# Patient Record
Sex: MALE | Race: WHITE | Employment: FULL TIME | URBAN - METROPOLITAN AREA
[De-identification: names, ages, dates, MRNs, and addresses within clinical notes are randomized per-mention and may not be internally consistent; named-entity substitution may affect disease eponyms.]

---

## 2022-08-23 ENCOUNTER — OFFICE VISIT (OUTPATIENT)
Dept: ENT CLINIC | Age: 27
End: 2022-08-23
Payer: COMMERCIAL

## 2022-08-23 VITALS
DIASTOLIC BLOOD PRESSURE: 84 MMHG | WEIGHT: 248 LBS | HEIGHT: 72 IN | BODY MASS INDEX: 33.59 KG/M2 | SYSTOLIC BLOOD PRESSURE: 132 MMHG

## 2022-08-23 DIAGNOSIS — J34.3 NASAL TURBINATE HYPERTROPHY: ICD-10-CM

## 2022-08-23 DIAGNOSIS — J34.2 DEVIATED NASAL SEPTUM: ICD-10-CM

## 2022-08-23 DIAGNOSIS — R09.81 NASAL CONGESTION: Primary | ICD-10-CM

## 2022-08-23 DIAGNOSIS — J34.89 SINUS PAIN: ICD-10-CM

## 2022-08-23 PROCEDURE — 99203 OFFICE O/P NEW LOW 30 MIN: CPT | Performed by: PHYSICIAN ASSISTANT

## 2022-08-23 PROCEDURE — 31231 NASAL ENDOSCOPY DX: CPT | Performed by: PHYSICIAN ASSISTANT

## 2022-08-23 RX ORDER — LISDEXAMFETAMINE DIMESYLATE 50 MG
CAPSULE ORAL
COMMUNITY
Start: 2022-08-22

## 2022-08-23 RX ORDER — TRAZODONE HYDROCHLORIDE 50 MG/1
TABLET ORAL
COMMUNITY
Start: 2022-08-02

## 2022-08-23 RX ORDER — ALBUTEROL SULFATE 90 UG/1
AEROSOL, METERED RESPIRATORY (INHALATION)
COMMUNITY
Start: 2022-08-22

## 2022-08-23 RX ORDER — LISINOPRIL 10 MG/1
TABLET ORAL
COMMUNITY
Start: 2022-07-11

## 2022-08-23 RX ORDER — CITALOPRAM 20 MG/1
TABLET ORAL
COMMUNITY

## 2022-08-23 RX ORDER — AZELASTINE HYDROCHLORIDE 137 UG/1
SPRAY, METERED NASAL
COMMUNITY
Start: 2022-08-22

## 2022-08-23 RX ORDER — FLUOXETINE HYDROCHLORIDE 20 MG/1
CAPSULE ORAL
COMMUNITY
Start: 2022-08-15

## 2022-08-23 ASSESSMENT — ENCOUNTER SYMPTOMS
SINUS PRESSURE: 1
EYE DISCHARGE: 0
COUGH: 0
ABDOMINAL PAIN: 0

## 2022-08-23 NOTE — PROGRESS NOTES
Chief Complaint   Patient presents with    Sinus Problem     Patient presents today with c/o recurrent sinus infections. HPI:  Kevin Johnson is a 28 y.o. male seen for evaluation of the sinuses. He reports that he will get pain and pressure in the cheek and forehead sinuses (L>R). He reports that this started 1-2 years ago. Patient denies taking any antibiotics. He reports that has an allergist but has not undergone testing yet. He states that he notices a reaction with pollen. He states that he moved to [place] from [place] 3 years ago and did not have the problems with his sinuses. Patient reports that he has taken an antihistamine which seemed to help. He reports that he has tried flonase and astelin which have seemed to help. He denies any prior sinus surgery. Of note, patient reports that he had outpatient surgery as a child for nosebleeds. Past Medical History, Past Surgical History, Family history, Social History, and Medications were all reviewed with the patient today and updated as necessary. No Known Allergies  There is no problem list on file for this patient. Current Outpatient Medications   Medication Sig    VYVANSE 50 MG capsule     lisinopril (PRINIVIL;ZESTRIL) 10 MG tablet TAKE ONE TABLET BY MOUTH ONE TIME DAILY    FLUoxetine (PROZAC) 20 MG capsule TAKE ONE CAPSULE BY MOUTH ONE TIME DAILY FOR 90 DAYS    citalopram (CELEXA) 20 MG tablet citalopram 20 mg tablet    Azelastine HCl 137 MCG/SPRAY SOLN     albuterol sulfate HFA (PROVENTIL;VENTOLIN;PROAIR) 108 (90 Base) MCG/ACT inhaler     traZODone (DESYREL) 50 MG tablet TAKE ONE TO THREE TABLETS BY MOUTH AT BEDTIME AS NEEDED     No current facility-administered medications for this visit. History reviewed. No pertinent past medical history.   Social History     Tobacco Use    Smoking status: Never    Smokeless tobacco: Never   Substance Use Topics    Alcohol use: Not Currently     Comment: rare use     No past surgical history on file.  No family history on file. ROS:    Review of Systems   Constitutional:  Negative for fever. HENT:  Positive for congestion and sinus pressure. Negative for ear discharge and ear pain. Eyes:  Negative for discharge. Respiratory:  Negative for cough. Cardiovascular:  Negative for chest pain. Gastrointestinal:  Negative for abdominal pain. Endocrine: Negative for cold intolerance. Genitourinary:  Negative for difficulty urinating. Musculoskeletal:  Negative for neck pain. Skin:  Negative for rash. Allergic/Immunologic: Negative for environmental allergies. Neurological:  Negative for dizziness. Hematological:  Negative for adenopathy. Psychiatric/Behavioral:  Negative for agitation. PHYSICAL EXAM:    /84   Ht 6' (1.829 m)   Wt 248 lb (112.5 kg)   BMI 33.63 kg/m²     Head  Head and Face - The head and face are atraumatic, normocephalic. The salivary glands are intact and the facial appearance is symmetric. Head shape - No scars, lesions, or masses    Ear  Ear - Tympanic membranes are clear, the external auditory canal is without discharge and the tympanic membranes are mobile. There is no tympanic membrane erythema and no middle ear opacity is visualized. Pinna: bilateral - No hematomas or lacerations    Eye  Eyeball - bilateral - extraocular motions intact, equal in size and movement    Nose and Sinuses  Nose - mucosa is pink and the septum is deviated to the right with left septal spur. There are no nasal lesions and there was mild turbinate hypertrophy. Mouth and Throat  Lips - upper lip - normal: no dryness, cracking, pallor, cyanosis, or vesicular eruption. Lower lip: normal: no dryness, cracking, pallor, cyanosis, or vesicular eruption. Teeth and Gums - No bleeding, no inflammation or ulceration. Lips - Pink and symmetrical  Oral Cavity - Oral mucosa pink, soft and hard palates contiguous and tongue moist without ulcers.   The mucosa is pill, continue flonase and Astelin. He will follow up in 4-6 weeks for reevaluation. If no improvement, he may need a round of antibiotics and possible CT scan of the sinuses. Thank you for the opportunity to participate in the care of this patient. Please let me know if you have any further questions or concerns.     Bharath Serna PA-C  8/23/2022

## 2022-10-11 ENCOUNTER — OFFICE VISIT (OUTPATIENT)
Dept: ENT CLINIC | Age: 27
End: 2022-10-11
Payer: COMMERCIAL

## 2022-10-11 VITALS
SYSTOLIC BLOOD PRESSURE: 118 MMHG | BODY MASS INDEX: 32.51 KG/M2 | WEIGHT: 240 LBS | DIASTOLIC BLOOD PRESSURE: 82 MMHG | HEIGHT: 72 IN

## 2022-10-11 DIAGNOSIS — J34.89 SINUS PAIN: ICD-10-CM

## 2022-10-11 DIAGNOSIS — J34.2 DEVIATED NASAL SEPTUM: ICD-10-CM

## 2022-10-11 DIAGNOSIS — J34.3 NASAL TURBINATE HYPERTROPHY: ICD-10-CM

## 2022-10-11 DIAGNOSIS — J34.89 NASAL OBSTRUCTION: Primary | ICD-10-CM

## 2022-10-11 PROCEDURE — 31231 NASAL ENDOSCOPY DX: CPT | Performed by: STUDENT IN AN ORGANIZED HEALTH CARE EDUCATION/TRAINING PROGRAM

## 2022-10-11 PROCEDURE — 99214 OFFICE O/P EST MOD 30 MIN: CPT | Performed by: STUDENT IN AN ORGANIZED HEALTH CARE EDUCATION/TRAINING PROGRAM

## 2022-10-11 RX ORDER — PREDNISONE 20 MG/1
TABLET ORAL
Qty: 16 TABLET | Refills: 0 | Status: SHIPPED | OUTPATIENT
Start: 2022-10-11

## 2022-10-11 RX ORDER — AMOXICILLIN AND CLAVULANATE POTASSIUM 875; 125 MG/1; MG/1
1 TABLET, FILM COATED ORAL 2 TIMES DAILY
Qty: 28 TABLET | Refills: 0 | Status: SHIPPED | OUTPATIENT
Start: 2022-10-11 | End: 2022-10-25

## 2022-10-11 ASSESSMENT — ENCOUNTER SYMPTOMS
ABDOMINAL PAIN: 0
COUGH: 0
EYE DISCHARGE: 0

## 2022-10-25 ENCOUNTER — HOSPITAL ENCOUNTER (OUTPATIENT)
Dept: CT IMAGING | Age: 27
Discharge: HOME | End: 2022-10-28
Payer: COMMERCIAL

## 2022-10-25 DIAGNOSIS — J34.89 NASAL OBSTRUCTION: ICD-10-CM

## 2022-10-25 DIAGNOSIS — J34.89 SINUS PAIN: ICD-10-CM

## 2022-10-25 PROCEDURE — 70486 CT MAXILLOFACIAL W/O DYE: CPT

## 2022-11-09 ENCOUNTER — OFFICE VISIT (OUTPATIENT)
Dept: ENT CLINIC | Age: 27
End: 2022-11-09
Payer: COMMERCIAL

## 2022-11-09 VITALS — RESPIRATION RATE: 18 BRPM | HEIGHT: 72 IN | WEIGHT: 232 LBS | BODY MASS INDEX: 31.42 KG/M2

## 2022-11-09 DIAGNOSIS — J34.89 NASAL OBSTRUCTION: Primary | ICD-10-CM

## 2022-11-09 DIAGNOSIS — J34.3 NASAL TURBINATE HYPERTROPHY: ICD-10-CM

## 2022-11-09 DIAGNOSIS — J34.2 DEVIATED NASAL SEPTUM: ICD-10-CM

## 2022-11-09 PROCEDURE — 99213 OFFICE O/P EST LOW 20 MIN: CPT | Performed by: STUDENT IN AN ORGANIZED HEALTH CARE EDUCATION/TRAINING PROGRAM

## 2022-11-09 RX ORDER — CARBINOXAMINE MALEATE 4 MG/1
TABLET ORAL
COMMUNITY
Start: 2022-11-08

## 2022-11-09 RX ORDER — OLOPATADINE HYDROCHLORIDE AND MOMETASONE FUROATE 25; 665 UG/1; UG/1
SPRAY, METERED NASAL
COMMUNITY
Start: 2022-11-08

## 2022-11-09 RX ORDER — BUDESONIDE AND FORMOTEROL FUMARATE DIHYDRATE 80; 4.5 UG/1; UG/1
AEROSOL RESPIRATORY (INHALATION)
COMMUNITY
Start: 2022-11-08

## 2022-11-09 ASSESSMENT — ENCOUNTER SYMPTOMS
ABDOMINAL PAIN: 0
EYE DISCHARGE: 0
COUGH: 0

## 2022-11-09 NOTE — PROGRESS NOTES
HPI:  Emely Bang is a 28 y.o. male seen Established   Chief Complaint   Patient presents with    Follow-up     Patient presents today for CT result review. 65-year-old male presents for follow-up evaluation for again stated reasoning of significant nasal airway obstruction congestion and difficulty clearing his nose. This is been despite the use of long-term increasing allergy medical therapy with antihistamines and intranasal steroid sprays. He had a trial of long-term antibiotics then followed up by CT of the paranasal sinuses for long-term sinonasal congestion and pressure. He presents today to review results and make further management recommendations. Past Medical History, Past Surgical History, Family history, Social History, and Medications were all reviewed with the patient today and updated as necessary. No Known Allergies    There is no problem list on file for this patient. Current Outpatient Medications   Medication Sig    Carbinoxamine Maleate 4 MG TABS     budesonide-formoterol (SYMBICORT) 80-4.5 MCG/ACT AERO 2 puffs    Olopatadine-Mometasone (RYALTRIS) 665-25 MCG/ACT SUSP 2 puffs in each nostril    predniSONE (DELTASONE) 20 MG tablet 60 mg (3 tabs) PO once daily for 3 days; 40 mg (2 tabs) for 2 days; 20 mg (1 tab) for 2 days; 10 mg (1/2 tab) for 2 days    VYVANSE 50 MG capsule     lisinopril (PRINIVIL;ZESTRIL) 10 MG tablet TAKE ONE TABLET BY MOUTH ONE TIME DAILY    FLUoxetine (PROZAC) 20 MG capsule TAKE ONE CAPSULE BY MOUTH ONE TIME DAILY FOR 90 DAYS    citalopram (CELEXA) 20 MG tablet citalopram 20 mg tablet    Azelastine HCl 137 MCG/SPRAY SOLN     albuterol sulfate HFA (PROVENTIL;VENTOLIN;PROAIR) 108 (90 Base) MCG/ACT inhaler     traZODone (DESYREL) 50 MG tablet TAKE ONE TO THREE TABLETS BY MOUTH AT BEDTIME AS NEEDED     No current facility-administered medications for this visit. History reviewed. No pertinent past medical history. History reviewed.  No pertinent surgical history. Social History     Tobacco Use    Smoking status: Never    Smokeless tobacco: Never   Substance Use Topics    Alcohol use: Not Currently     Comment: rare use       History reviewed. No pertinent family history. ROS:    Review of Systems   Constitutional:  Negative for fever. HENT:  Positive for congestion. Negative for ear discharge and ear pain. Eyes:  Negative for discharge. Respiratory:  Negative for cough. Gastrointestinal:  Negative for abdominal pain. Endocrine: Negative for cold intolerance. Genitourinary:  Negative for difficulty urinating. Musculoskeletal:  Negative for arthralgias and neck pain. Skin:  Negative for rash. Allergic/Immunologic: Negative for environmental allergies. Neurological:  Negative for dizziness. Hematological:  Negative for adenopathy. Psychiatric/Behavioral:  Negative for confusion. PHYSICAL EXAM:    Resp 18   Ht 6' (1.829 m)   Wt 232 lb (105.2 kg)   BMI 31.46 kg/m²     Physical Exam  Vitals and nursing note reviewed. Constitutional:       Appearance: Normal appearance. HENT:      Head: Normocephalic and atraumatic. Right Ear: Tympanic membrane, ear canal and external ear normal. There is no impacted cerumen. Left Ear: Tympanic membrane, ear canal and external ear normal. There is no impacted cerumen. Nose: Septal deviation present. No congestion or rhinorrhea. Right Turbinates: Enlarged. Left Turbinates: Enlarged. Comments: Significant left-sided DNS with bone spur and right-sided caudal deviation as well. Large compensatory turbinate hypertrophy right greater than left     Mouth/Throat:      Mouth: Mucous membranes are moist.      Pharynx: Oropharynx is clear. No oropharyngeal exudate or posterior oropharyngeal erythema. Eyes:      General: No scleral icterus.   Pulmonary:      Effort: Pulmonary effort is normal.   Musculoskeletal:      Cervical back: Normal range of motion and neck supple. No rigidity. Lymphadenopathy:      Cervical: No cervical adenopathy. Skin:     General: Skin is warm and dry. Neurological:      Mental Status: He is alert and oriented to person, place, and time. Psychiatric:         Mood and Affect: Mood normal.         Behavior: Behavior normal.        CT Result (most recent):  CT MAXILLOFACIAL WO CONTRAST 10/25/2022    Narrative  CT PARANASAL SINUSES WITHOUT CONTRAST. INDICATION: Chronic sinusitis for 2 1/2 years. TECHNIQUE: Axial 2.5 and 0.625 mm scans with coronal reconstructions. Radiation  dose reduction techniques were used for this study. Our CT scanners use one or  more of the following:  Automated exposure control, adjustment of the mA and or  kV according to patient size, iterative reconstruction. COMPARISON: None. FINDINGS:  FRONTAL SINUSES: Clear without mucosal thickening or fluid. ETHMOID SINUSES: Clear both anteriorly and posteriorly. SPHENOID SINUSES: Also clear without mucosal thickening or fluid. MAXILLARY SINUSES / OSTEOMEATAL UNITS:  RIGHT: There is a patent infundibulum and thin uncinate process. Prominent  adjacent inferior medial orbital ethmoid air cells. LEFT:  There is a patent infundibulum and thin uncinate process. Prominent  adjacent inferior medial orbital ethmoid air cells. TURBINATES: Unremarkable. NASAL SEPTUM: Small transverse leftward nasal septal spur. Mild leftward  deviation    MASTOID air cells: Included portion clear bilaterally. Included intracranial contents: Unremarkable. Globes and orbits: Also  unremarkable. Impression  Paranasal sinuses are clear without mucosal thickening or fluid. There are prominent bilateral adjacent inferior medial orbital ethmoid air  cells. Leftward transverse nasal septal spur with mild leftward septal  deviation. ASSESSMENT and PLAN        ICD-10-CM    1. Nasal obstruction  J34.89       2. Deviated nasal septum  J34.2       3.  Nasal turbinate hypertrophy  J34.3           Patient has had a mild benefit temporarily in regards to his stated sinonasal pressure and pain but continues to have long-term worsening nasal airway obstruction and difficulty clearing his nose. We have reviewed his CT imaging which shows minimal concerns in regards to chronic sinusitis. He does have a significant bilateral DNS and inferior turbinate hypertrophy. I have discouraged going forward with endoscopic sinus surgery but we will make plans for septoplasty and turbinate reduction. We have reviewed all risk benefits and alternatives to septoplasty and turbinate reduction and he would like to go forward with surgery as discussed. I discussed all the risks of septoplasty and/or SMR of turbinates surgery including bleeding, septal hematoma, septal perforation, continued nasal obstruction, change in sense of smell, CSF leak, and numbness/tingling of upper teeth/lips, and they would like to proceed.       Juliano Alonso, DO  11/9/2022

## 2022-11-10 ENCOUNTER — TELEPHONE (OUTPATIENT)
Dept: ENT CLINIC | Age: 27
End: 2022-11-10

## 2022-12-07 ENCOUNTER — TELEPHONE (OUTPATIENT)
Dept: ENT CLINIC | Age: 27
End: 2022-12-07

## 2022-12-07 NOTE — TELEPHONE ENCOUNTER
Patient was called about his post op appt - Julia Long would be seeing him Cristel Barlow is off) on that call patient requested a Dr's note from 01/03 (surgery day) to 01/16 (post op appt) for his job - to be back on 1/17 -     Please zion back, thanks

## 2022-12-30 DIAGNOSIS — G89.18 POST-OP PAIN: Primary | ICD-10-CM

## 2023-01-03 RX ORDER — ONDANSETRON 4 MG/1
4 TABLET, FILM COATED ORAL 3 TIMES DAILY PRN
Qty: 15 TABLET | Refills: 0 | Status: SHIPPED | OUTPATIENT
Start: 2023-01-03 | End: 2023-01-08

## 2023-01-03 RX ORDER — CEFUROXIME AXETIL 250 MG/1
250 TABLET ORAL 2 TIMES DAILY
Qty: 10 TABLET | Refills: 0 | Status: SHIPPED | OUTPATIENT
Start: 2023-01-03 | End: 2023-01-08

## 2023-01-03 RX ORDER — HYDROCODONE BITARTRATE AND ACETAMINOPHEN 5; 325 MG/1; MG/1
1 TABLET ORAL EVERY 4 HOURS PRN
Qty: 30 TABLET | Refills: 0 | Status: SHIPPED | OUTPATIENT
Start: 2023-01-03 | End: 2023-01-08

## 2023-01-09 ENCOUNTER — OFFICE VISIT (OUTPATIENT)
Dept: ENT CLINIC | Age: 28
End: 2023-01-09

## 2023-01-09 VITALS — HEIGHT: 72 IN | BODY MASS INDEX: 31.42 KG/M2 | WEIGHT: 232 LBS | RESPIRATION RATE: 17 BRPM

## 2023-01-09 DIAGNOSIS — Z98.890 S/P NASAL SEPTOPLASTY: ICD-10-CM

## 2023-01-09 DIAGNOSIS — J34.89 NASAL OBSTRUCTION: Primary | ICD-10-CM

## 2023-01-09 PROCEDURE — 99024 POSTOP FOLLOW-UP VISIT: CPT | Performed by: STUDENT IN AN ORGANIZED HEALTH CARE EDUCATION/TRAINING PROGRAM

## 2023-01-09 ASSESSMENT — ENCOUNTER SYMPTOMS
ABDOMINAL PAIN: 0
EYE DISCHARGE: 0
COUGH: 0

## 2024-07-10 ENCOUNTER — OFFICE VISIT (OUTPATIENT)
Dept: ENT CLINIC | Age: 29
End: 2024-07-10
Payer: COMMERCIAL

## 2024-07-10 VITALS
RESPIRATION RATE: 18 BRPM | WEIGHT: 236 LBS | HEIGHT: 72 IN | BODY MASS INDEX: 31.97 KG/M2 | HEART RATE: 124 BPM | OXYGEN SATURATION: 98 %

## 2024-07-10 DIAGNOSIS — Z98.890 S/P NASAL SEPTOPLASTY: ICD-10-CM

## 2024-07-10 DIAGNOSIS — J34.89 SINUS PRESSURE: Primary | ICD-10-CM

## 2024-07-10 DIAGNOSIS — R51.9 FACIAL PAIN: ICD-10-CM

## 2024-07-10 PROCEDURE — 99213 OFFICE O/P EST LOW 20 MIN: CPT | Performed by: STUDENT IN AN ORGANIZED HEALTH CARE EDUCATION/TRAINING PROGRAM

## 2024-07-10 PROCEDURE — 31231 NASAL ENDOSCOPY DX: CPT | Performed by: STUDENT IN AN ORGANIZED HEALTH CARE EDUCATION/TRAINING PROGRAM

## 2024-07-10 RX ORDER — FLUTICASONE PROPIONATE AND SALMETEROL 100; 50 UG/1; UG/1
POWDER RESPIRATORY (INHALATION)
COMMUNITY
Start: 2024-05-21

## 2024-07-10 RX ORDER — LORATADINE 10 MG/1
10 TABLET ORAL DAILY
COMMUNITY
Start: 2024-05-28

## 2024-07-10 ASSESSMENT — ENCOUNTER SYMPTOMS
NAUSEA: 0
SINUS PAIN: 1
EYE ITCHING: 0
EYE PAIN: 0
CONSTIPATION: 0
DIARRHEA: 0
STRIDOR: 0
CHOKING: 0
SINUS PRESSURE: 1
COUGH: 0
FACIAL SWELLING: 0
APNEA: 0
SHORTNESS OF BREATH: 0
EYE DISCHARGE: 0
WHEEZING: 0

## 2024-07-10 NOTE — PROGRESS NOTES
show any significant mucopurulence or intranasal polyps lesions or masses to account for his ongoing sinus pressure and midface pain.  Differential etiology of his symptoms to include chronic sinusitis versus chronic migraine syndrome.  I am somewhat doubtful of sinusitis given his previous workup with imaging 2 years ago.  As this is worsened I have offered an additional CT image to see if there is any interval development of a chronic sinus pathology.  I will plan to reach out to the patient to review results once available and eventually he may require referral to neurologist.    Marta Rojas,   7/10/2024

## 2024-07-15 DIAGNOSIS — J34.89 SINUS PRESSURE: ICD-10-CM

## 2024-07-15 DIAGNOSIS — R51.9 FACIAL PAIN: ICD-10-CM

## 2024-07-15 DIAGNOSIS — G43.909 MIGRAINE SYNDROME: Primary | ICD-10-CM

## 2025-02-10 PROBLEM — F32.A DEPRESSIVE DISORDER: Status: ACTIVE | Noted: 2018-08-15

## 2025-02-10 PROBLEM — F41.9 ANXIETY: Status: ACTIVE | Noted: 2018-08-15

## 2025-02-10 PROBLEM — J45.909 ASTHMA: Status: ACTIVE | Noted: 2018-08-15

## 2025-02-10 NOTE — PROGRESS NOTES
good sleep hygiene, routine medial schedules, regular exercise and managing triggers.  Keep a headache diary  to reveal triggers and possible patterns.  Triggers may be: Food, stress, perfumes, alcohol, or even chocolate.  Drink plenty of water and try to get 8 hours of sleep each night to reduce risk factors that may cause headaches.    2. Memory changes  MOCA 25/30 normal, however noted difficulty with visuospatial/executive functioning and delayed recall.   Will obtain MRI of brain and labs to evaluate for infectious or metabolic etiologies.   - MRI BRAIN WO CONTRAST; Future  - Vitamin B12; Future  - Folate; Future  - CBC with Auto Differential; Future  - Basic Metabolic Panel; Future  - TSH reflex to FT4; Future    3. History of ADHD  Followed by Psychiatry, currently on Vyvance and Aderrall  Recommend follow up with psychiatry given hx of ADHD and potential side effects of medications.   4. Anxiety and depression  Followed by psychiatry. Currently on fluoxetine and trazodone. Denies SI.     5. KOREY on CPAP  He is compliant with CPAP nightly. Followed by sleep medicine.     Follow up in 6-8 weeks or sooner if needed       Bella Reyez [facility]  [address] [place] [address], [address] [place], [place] [id number]  Phone: [phone]

## 2025-02-11 ENCOUNTER — OFFICE VISIT (OUTPATIENT)
Dept: NEUROLOGY | Age: 30
End: 2025-02-11
Payer: COMMERCIAL

## 2025-02-11 VITALS
OXYGEN SATURATION: 100 % | WEIGHT: 236 LBS | SYSTOLIC BLOOD PRESSURE: 130 MMHG | HEIGHT: 72 IN | BODY MASS INDEX: 31.97 KG/M2 | HEART RATE: 85 BPM | DIASTOLIC BLOOD PRESSURE: 80 MMHG

## 2025-02-11 DIAGNOSIS — F32.A ANXIETY AND DEPRESSION: ICD-10-CM

## 2025-02-11 DIAGNOSIS — R41.3 MEMORY CHANGES: ICD-10-CM

## 2025-02-11 DIAGNOSIS — Z86.59 HISTORY OF ADHD: ICD-10-CM

## 2025-02-11 DIAGNOSIS — G47.33 OSA ON CPAP: ICD-10-CM

## 2025-02-11 DIAGNOSIS — G43.009 MIGRAINE WITHOUT AURA AND WITHOUT STATUS MIGRAINOSUS, NOT INTRACTABLE: Primary | ICD-10-CM

## 2025-02-11 DIAGNOSIS — F41.9 ANXIETY AND DEPRESSION: ICD-10-CM

## 2025-02-11 LAB
ANION GAP SERPL CALC-SCNC: 14 MMOL/L (ref 7–16)
BASOPHILS # BLD: 0.05 K/UL (ref 0–0.2)
BASOPHILS NFR BLD: 0.6 % (ref 0–2)
BUN SERPL-MCNC: 13 MG/DL (ref 6–23)
CALCIUM SERPL-MCNC: 9.1 MG/DL (ref 8.8–10.2)
CHLORIDE SERPL-SCNC: 101 MMOL/L (ref 98–107)
CO2 SERPL-SCNC: 24 MMOL/L (ref 20–29)
CREAT SERPL-MCNC: 1.07 MG/DL (ref 0.8–1.3)
DIFFERENTIAL METHOD BLD: ABNORMAL
EOSINOPHIL # BLD: 0.02 K/UL (ref 0–0.8)
EOSINOPHIL NFR BLD: 0.3 % (ref 0.5–7.8)
ERYTHROCYTE [DISTWIDTH] IN BLOOD BY AUTOMATED COUNT: 12.7 % (ref 11.9–14.6)
FOLATE SERPL-MCNC: 8.1 NG/ML (ref 3.1–17.5)
GLUCOSE SERPL-MCNC: 96 MG/DL (ref 70–99)
HCT VFR BLD AUTO: 43 % (ref 41.1–50.3)
HGB BLD-MCNC: 14.7 G/DL (ref 13.6–17.2)
IMM GRANULOCYTES # BLD AUTO: 0.02 K/UL (ref 0–0.5)
IMM GRANULOCYTES NFR BLD AUTO: 0.3 % (ref 0–5)
LYMPHOCYTES # BLD: 1.79 K/UL (ref 0.5–4.6)
LYMPHOCYTES NFR BLD: 22.6 % (ref 13–44)
MCH RBC QN AUTO: 30.3 PG (ref 26.1–32.9)
MCHC RBC AUTO-ENTMCNC: 34.2 G/DL (ref 31.4–35)
MCV RBC AUTO: 88.7 FL (ref 82–102)
MONOCYTES # BLD: 0.39 K/UL (ref 0.1–1.3)
MONOCYTES NFR BLD: 4.9 % (ref 4–12)
NEUTS SEG # BLD: 5.64 K/UL (ref 1.7–8.2)
NEUTS SEG NFR BLD: 71.3 % (ref 43–78)
NRBC # BLD: 0 K/UL (ref 0–0.2)
PLATELET # BLD AUTO: 259 K/UL (ref 150–450)
PMV BLD AUTO: 9.2 FL (ref 9.4–12.3)
POTASSIUM SERPL-SCNC: 4.2 MMOL/L (ref 3.5–5.1)
RBC # BLD AUTO: 4.85 M/UL (ref 4.23–5.6)
SODIUM SERPL-SCNC: 139 MMOL/L (ref 136–145)
TSH W FREE THYROID IF ABNORMAL: 2.65 UIU/ML (ref 0.27–4.2)
VIT B12 SERPL-MCNC: 577 PG/ML (ref 193–986)
WBC # BLD AUTO: 7.9 K/UL (ref 4.3–11.1)

## 2025-02-11 PROCEDURE — 99204 OFFICE O/P NEW MOD 45 MIN: CPT | Performed by: NURSE PRACTITIONER

## 2025-02-11 RX ORDER — FLUOXETINE 40 MG/1
40 CAPSULE ORAL DAILY
COMMUNITY
Start: 2025-01-17

## 2025-02-11 RX ORDER — DEXTROAMPHETAMINE SACCHARATE, AMPHETAMINE ASPARTATE, DEXTROAMPHETAMINE SULFATE AND AMPHETAMINE SULFATE 5; 5; 5; 5 MG/1; MG/1; MG/1; MG/1
20 TABLET ORAL 3 TIMES DAILY
COMMUNITY
Start: 2025-01-17

## 2025-02-11 RX ORDER — TRAZODONE HYDROCHLORIDE 100 MG/1
300 TABLET ORAL NIGHTLY
COMMUNITY
Start: 2025-02-05

## 2025-02-11 ASSESSMENT — PATIENT HEALTH QUESTIONNAIRE - PHQ9
1. LITTLE INTEREST OR PLEASURE IN DOING THINGS: NOT AT ALL
SUM OF ALL RESPONSES TO PHQ9 QUESTIONS 1 & 2: 0
SUM OF ALL RESPONSES TO PHQ QUESTIONS 1-9: 0
2. FEELING DOWN, DEPRESSED OR HOPELESS: NOT AT ALL
SUM OF ALL RESPONSES TO PHQ QUESTIONS 1-9: 0

## 2025-02-11 ASSESSMENT — ENCOUNTER SYMPTOMS
APNEA: 1
ALLERGIC/IMMUNOLOGIC NEGATIVE: 1
NAUSEA: 1
PHOTOPHOBIA: 1

## 2025-02-11 NOTE — PATIENT INSTRUCTIONS
Headache Education:   Instructed the patient on over-the-counter headache management medications including: CoQ10, magnesium oxide, and butterbur.  To avoid a pain medication overuse headache trying not to take pain medicines more than 3 doses a week.   Avoid use of Fioricet or opioids to treat headaches as this can increase risk for rebound headaches.   To help relieve headache symptoms without taking pain medicine lie down under darkroom and drink glass of water.  Consider lifestyle modification including good sleep hygiene, routine medial schedules, regular exercise and managing triggers.  Keep a headache diary  to reveal triggers and possible patterns.  Triggers may be: Food, stress, perfumes, alcohol, or even chocolate.  Drink plenty of water and try to get 8 hours of sleep each night to reduce risk factors that may cause headaches.          Samples of Nurtec ODT 75 mg and Ubrelvy 100 mg provided to patient. Advised not to take medication on same day and to update office on efficacy.   Instructions:  Nurtec ODT 75 mg daily as needed for migraine abortive therapy. Not to exceed 75mg/24 hours.   Ubrelvy 100 mg daily as needed for migraine abortive therapy. May repeat for 1 dose in 2 hours if needed. Not to exceed 200mg/24 hours.

## 2025-04-01 ENCOUNTER — OFFICE VISIT (OUTPATIENT)
Dept: NEUROLOGY | Age: 30
End: 2025-04-01
Payer: COMMERCIAL

## 2025-04-01 VITALS
OXYGEN SATURATION: 100 % | BODY MASS INDEX: 36.03 KG/M2 | HEIGHT: 72 IN | SYSTOLIC BLOOD PRESSURE: 133 MMHG | HEART RATE: 87 BPM | DIASTOLIC BLOOD PRESSURE: 80 MMHG | WEIGHT: 266 LBS

## 2025-04-01 DIAGNOSIS — G43.009 MIGRAINE WITHOUT AURA AND WITHOUT STATUS MIGRAINOSUS, NOT INTRACTABLE: Primary | ICD-10-CM

## 2025-04-01 DIAGNOSIS — F32.A ANXIETY AND DEPRESSION: ICD-10-CM

## 2025-04-01 DIAGNOSIS — G47.33 OSA ON CPAP: ICD-10-CM

## 2025-04-01 DIAGNOSIS — F41.9 ANXIETY AND DEPRESSION: ICD-10-CM

## 2025-04-01 DIAGNOSIS — R41.3 MEMORY CHANGES: ICD-10-CM

## 2025-04-01 PROCEDURE — 99214 OFFICE O/P EST MOD 30 MIN: CPT | Performed by: NURSE PRACTITIONER

## 2025-04-01 RX ORDER — UBROGEPANT 100 MG/1
100 TABLET ORAL DAILY PRN
Qty: 16 TABLET | Refills: 11 | Status: SHIPPED | OUTPATIENT
Start: 2025-04-01

## 2025-04-01 ASSESSMENT — ENCOUNTER SYMPTOMS
NAUSEA: 1
APNEA: 1
ALLERGIC/IMMUNOLOGIC NEGATIVE: 1
PHOTOPHOBIA: 1

## 2025-04-01 NOTE — PROGRESS NOTES
Patient: Landon Hernandez  MRN: [id number]  : 1995    CC: Headaches    HPI:  Landon Hernandez is a 29 y.o.yo male here for new patient evaluation for headaches.  Referred by    ref. provider found    Denies headache today. Headaches are located right frontal region and radiate retro-orbitally.  They began ~2-3 years ago.  The current frequency of headaches: 2-3/mth.  Duration: 3-5 hours.  Severity is 8/10. Quality of headaches are described as pressure, gradual onset.  Associated symptoms: photophobia, phonophobia, nausea, difficulty concentrating. Denies vision changes, thunderclap, rhinorrhea, excessive tearing.  Unable to identify triggers.   Factors that relieve the headaches are laying down, sleep. Has taken ibuprofen with mucinex D, with moderate relief after 1 hour.       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 Noted increased stressors at work over the past year.     Endorses difficulty with memory and generalized \" brain fog\", having difficulty with names and concentrating on tasks. Noted he zones on his work. He utilizes a checklist and still forgets things.  Hx of ADHD and anxiety/depression followed by psychiatry.      Hx of KOREY- compliant with CPAP nightly. Hx of insomnia, currently managed on trazodone.     Drinks ~1 cup of coffee, and ~2 diet cokes or another soda. Drinks ~ 4 16 oz bottles of water daily.     Denies tobacco use, alcohol use or recreational drug use.     Denies recent head trauma, changes in medications or illness.      Family Members with headache history: mother     Current Medications used for HA treatment: OTC ibuprofen    Medications tried in the past: currently on fluoxetine, trazodone, mucinex    Associated medical problems: anxiety/depression and asthma, septoplasty, SMR turbinates 1/3/2023, KOREY on CPAP    Previous Imaging: none    Previous Testing: none    Interval history:     He is here today by himself.  Continues to endorse memory difficulties

## 2025-04-02 ENCOUNTER — TELEPHONE (OUTPATIENT)
Dept: NEUROLOGY | Age: 30
End: 2025-04-02